# Patient Record
Sex: FEMALE | Race: WHITE | NOT HISPANIC OR LATINO
[De-identification: names, ages, dates, MRNs, and addresses within clinical notes are randomized per-mention and may not be internally consistent; named-entity substitution may affect disease eponyms.]

---

## 2021-05-13 PROBLEM — Z00.00 ENCOUNTER FOR PREVENTIVE HEALTH EXAMINATION: Status: ACTIVE | Noted: 2021-05-13

## 2021-06-14 ENCOUNTER — APPOINTMENT (OUTPATIENT)
Dept: ANTEPARTUM | Facility: CLINIC | Age: 21
End: 2021-06-14
Payer: COMMERCIAL

## 2021-06-14 ENCOUNTER — ASOB RESULT (OUTPATIENT)
Age: 21
End: 2021-06-14

## 2021-06-14 PROCEDURE — 99072 ADDL SUPL MATRL&STAF TM PHE: CPT

## 2021-06-14 PROCEDURE — 76811 OB US DETAILED SNGL FETUS: CPT

## 2021-06-28 ENCOUNTER — TRANSCRIPTION ENCOUNTER (OUTPATIENT)
Age: 21
End: 2021-06-28

## 2021-08-25 ENCOUNTER — NON-APPOINTMENT (OUTPATIENT)
Age: 21
End: 2021-08-25

## 2021-08-26 ENCOUNTER — NON-APPOINTMENT (OUTPATIENT)
Age: 21
End: 2021-08-26

## 2021-08-27 ENCOUNTER — APPOINTMENT (OUTPATIENT)
Dept: CARDIOLOGY | Facility: CLINIC | Age: 21
End: 2021-08-27
Payer: COMMERCIAL

## 2021-08-27 ENCOUNTER — NON-APPOINTMENT (OUTPATIENT)
Age: 21
End: 2021-08-27

## 2021-08-27 VITALS
OXYGEN SATURATION: 98 % | HEART RATE: 109 BPM | WEIGHT: 178 LBS | DIASTOLIC BLOOD PRESSURE: 64 MMHG | SYSTOLIC BLOOD PRESSURE: 108 MMHG | HEIGHT: 69 IN | BODY MASS INDEX: 26.36 KG/M2

## 2021-08-27 DIAGNOSIS — R00.2 PALPITATIONS: ICD-10-CM

## 2021-08-27 DIAGNOSIS — I34.1 NONRHEUMATIC MITRAL (VALVE) PROLAPSE: ICD-10-CM

## 2021-08-27 DIAGNOSIS — Z34.93 ENCOUNTER FOR SUPERVISION OF NORMAL PREGNANCY, UNSPECIFIED, THIRD TRIMESTER: ICD-10-CM

## 2021-08-27 PROCEDURE — 93000 ELECTROCARDIOGRAM COMPLETE: CPT

## 2021-08-27 PROCEDURE — 99204 OFFICE O/P NEW MOD 45 MIN: CPT

## 2021-08-27 NOTE — PHYSICAL EXAM
[Well Developed] : well developed [Well Nourished] : well nourished [No Acute Distress] : no acute distress [Normal Conjunctiva] : normal conjunctiva [Normal S1, S2] : normal S1, S2 [No Murmur] : no murmur [Clear Lung Fields] : clear lung fields [Good Air Entry] : good air entry [Soft] : abdomen soft [Non Tender] : non-tender [No Edema] : no edema [Alert and Oriented] : alert and oriented [de-identified] : Anxious [de-identified] : No murmur lying or in the upright position no mitral click

## 2021-08-27 NOTE — HISTORY OF PRESENT ILLNESS
[FreeTextEntry1] : Cristina Winter 21 years old healthy now 7 months pregnant.  She has no history of diabetes hypertension non-smoker no family history of significant heart disease\par \par She has a history of intermittent palpitations in the past\par \par In 2019 she complained of palpitations which are described to be what sounds like an extra heartbeat not a fast rapid beat with probable ports.  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  She recently has been experiencing an increase in the palpitations again described not as a tachycardia but has a sounds like premature beat followed by a strong heartbeat that makes her uncomfortable and can take her breath away but no dizziness or syncope\par \par She is otherwise healthy.  This is her first pregnancy\par \par EKG normal sinus rhythm and within normal limits

## 2021-08-27 NOTE — DISCUSSION/SUMMARY
[FreeTextEntry1] : 1.  I reassured her that I did not feel there was any significant cardiac issue\par \par 2.  She is concerned about the palpitations and they do occur pretty frequently.  I therefore have placed a Zio patch which will monitor her heart rate for 1 week.\par \par 3.  I will discussed the results of the Zio patch after its returned\par \par 4.  After the pregnancy, she should have a repeat echocardiogram to follow-up on this diagnosis of Caballero syndrome made in [place].  She is aware and will schedule after the pregnancy.

## 2021-08-27 NOTE — ASSESSMENT
[FreeTextEntry1] : Cristina Wong is 7 months pregnant with a history of palpitations that sound like premature beats followed by a pause and then a strong beat.  These are either APCs or PVCs.  Her physical exam is unremarkable, blood pressure is normal and physical exam is normal including no murmur even with provocative maneuvers\par \par She carries a diagnosis of Caballero syndrome but I do not appreciate any significant mitral regurgitation\par \par I believe these palpitations are benign either APCs or PVCs.

## 2021-08-27 NOTE — REASON FOR VISIT
[FreeTextEntry1] : Antoinette Winter 21 years old 7 months pregnant here for evaluation of palpitations\par \par Obstetrician: Dr. Portia Johnson

## 2021-10-05 ENCOUNTER — OUTPATIENT (OUTPATIENT)
Dept: OUTPATIENT SERVICES | Facility: HOSPITAL | Age: 21
LOS: 1 days | End: 2021-10-05
Payer: COMMERCIAL

## 2021-10-05 ENCOUNTER — APPOINTMENT (OUTPATIENT)
Dept: MRI IMAGING | Facility: IMAGING CENTER | Age: 21
End: 2021-10-05

## 2021-10-05 DIAGNOSIS — Z00.8 ENCOUNTER FOR OTHER GENERAL EXAMINATION: ICD-10-CM

## 2021-10-05 PROCEDURE — 70551 MRI BRAIN STEM W/O DYE: CPT | Mod: 26

## 2021-10-05 PROCEDURE — 70551 MRI BRAIN STEM W/O DYE: CPT

## 2021-10-28 ENCOUNTER — APPOINTMENT (OUTPATIENT)
Dept: ANTEPARTUM | Facility: CLINIC | Age: 21
End: 2021-10-28
Payer: COMMERCIAL

## 2021-10-28 ENCOUNTER — ASOB RESULT (OUTPATIENT)
Age: 21
End: 2021-10-28

## 2021-10-28 PROCEDURE — 76816 OB US FOLLOW-UP PER FETUS: CPT

## 2021-10-28 PROCEDURE — 76818 FETAL BIOPHYS PROFILE W/NST: CPT

## 2021-11-02 ENCOUNTER — APPOINTMENT (OUTPATIENT)
Dept: ANTEPARTUM | Facility: CLINIC | Age: 21
End: 2021-11-02
Payer: COMMERCIAL

## 2021-11-02 ENCOUNTER — ASOB RESULT (OUTPATIENT)
Age: 21
End: 2021-11-02

## 2021-11-02 ENCOUNTER — INPATIENT (INPATIENT)
Facility: HOSPITAL | Age: 21
LOS: 2 days | Discharge: HOME | End: 2021-11-05
Attending: OBSTETRICS & GYNECOLOGY | Admitting: OBSTETRICS & GYNECOLOGY
Payer: COMMERCIAL

## 2021-11-02 VITALS — OXYGEN SATURATION: 98 % | HEART RATE: 69 BPM

## 2021-11-02 DIAGNOSIS — Z34.80 ENCOUNTER FOR SUPERVISION OF OTHER NORMAL PREGNANCY, UNSPECIFIED TRIMESTER: ICD-10-CM

## 2021-11-02 LAB
BASOPHILS # BLD AUTO: 0.06 K/UL — SIGNIFICANT CHANGE UP (ref 0–0.2)
BASOPHILS NFR BLD AUTO: 0.5 % — SIGNIFICANT CHANGE UP (ref 0–2)
EOSINOPHIL # BLD AUTO: 0.05 K/UL — SIGNIFICANT CHANGE UP (ref 0–0.5)
EOSINOPHIL NFR BLD AUTO: 0.4 % — SIGNIFICANT CHANGE UP (ref 0–6)
HCT VFR BLD CALC: 34.7 % — SIGNIFICANT CHANGE UP (ref 34.5–45)
HGB BLD-MCNC: 11 G/DL — LOW (ref 11.5–15.5)
IMM GRANULOCYTES NFR BLD AUTO: 2 % — HIGH (ref 0–1.5)
LYMPHOCYTES # BLD AUTO: 18.4 % — SIGNIFICANT CHANGE UP (ref 13–44)
LYMPHOCYTES # BLD AUTO: 2.21 K/UL — SIGNIFICANT CHANGE UP (ref 1–3.3)
MCHC RBC-ENTMCNC: 25.9 PG — LOW (ref 27–34)
MCHC RBC-ENTMCNC: 31.7 GM/DL — LOW (ref 32–36)
MCV RBC AUTO: 81.8 FL — SIGNIFICANT CHANGE UP (ref 80–100)
MONOCYTES # BLD AUTO: 0.98 K/UL — HIGH (ref 0–0.9)
MONOCYTES NFR BLD AUTO: 8.1 % — SIGNIFICANT CHANGE UP (ref 2–14)
NEUTROPHILS # BLD AUTO: 8.5 K/UL — HIGH (ref 1.8–7.4)
NEUTROPHILS NFR BLD AUTO: 70.6 % — SIGNIFICANT CHANGE UP (ref 43–77)
NRBC # BLD: 0 /100 WBCS — SIGNIFICANT CHANGE UP (ref 0–0)
PLATELET # BLD AUTO: 227 K/UL — SIGNIFICANT CHANGE UP (ref 150–400)
RBC # BLD: 4.24 M/UL — SIGNIFICANT CHANGE UP (ref 3.8–5.2)
RBC # FLD: 13.7 % — SIGNIFICANT CHANGE UP (ref 10.3–14.5)
SARS-COV-2 RNA SPEC QL NAA+PROBE: SIGNIFICANT CHANGE UP
WBC # BLD: 12.04 K/UL — HIGH (ref 3.8–10.5)
WBC # FLD AUTO: 12.04 K/UL — HIGH (ref 3.8–10.5)

## 2021-11-02 PROCEDURE — 76818 FETAL BIOPHYS PROFILE W/NST: CPT

## 2021-11-02 RX ORDER — SODIUM CHLORIDE 9 MG/ML
1000 INJECTION, SOLUTION INTRAVENOUS
Refills: 0 | Status: DISCONTINUED | OUTPATIENT
Start: 2021-11-02 | End: 2021-11-03

## 2021-11-02 RX ORDER — CITRIC ACID/SODIUM CITRATE 300-500 MG
15 SOLUTION, ORAL ORAL EVERY 6 HOURS
Refills: 0 | Status: DISCONTINUED | OUTPATIENT
Start: 2021-11-02 | End: 2021-11-03

## 2021-11-02 RX ORDER — OXYTOCIN 10 UNIT/ML
333.33 VIAL (ML) INJECTION
Qty: 20 | Refills: 0 | Status: DISCONTINUED | OUTPATIENT
Start: 2021-11-02 | End: 2021-11-05

## 2021-11-02 RX ORDER — SENNA PLUS 8.6 MG/1
2 TABLET ORAL AT BEDTIME
Refills: 0 | Status: DISCONTINUED | OUTPATIENT
Start: 2021-11-02 | End: 2021-11-05

## 2021-11-02 RX ORDER — INFLUENZA VIRUS VACCINE 15; 15; 15; 15 UG/.5ML; UG/.5ML; UG/.5ML; UG/.5ML
0.5 SUSPENSION INTRAMUSCULAR ONCE
Refills: 0 | Status: DISCONTINUED | OUTPATIENT
Start: 2021-11-02 | End: 2021-11-05

## 2021-11-02 RX ADMIN — SODIUM CHLORIDE 125 MILLILITER(S): 9 INJECTION, SOLUTION INTRAVENOUS at 17:05

## 2021-11-02 RX ADMIN — SODIUM CHLORIDE 125 MILLILITER(S): 9 INJECTION, SOLUTION INTRAVENOUS at 16:45

## 2021-11-02 NOTE — OB PROVIDER H&P - NSHOSPITALIZATION_OBGYN_ALL_OB
I have reviewed discharge instructions with the patient. The patient verbalized understanding. Patient NAD, VSS. Patient armband removed and shredded.
Patient refused all medications. Patient only given ice chips for PO Challenge.
No

## 2021-11-02 NOTE — OB PROVIDER H&P - NSHPPHYSICALEXAM_GEN_ALL_CORE
Gen NAD  Vital Signs Last 24 Hrs  T(C): 36.7 (02 Nov 2021 15:30), Max: 36.7 (02 Nov 2021 15:30)  T(F): 98 (02 Nov 2021 15:30), Max: 98 (02 Nov 2021 15:30)  HR: 76 (02 Nov 2021 16:43) (66 - 105)  BP: 124/71 (02 Nov 2021 15:42) (121/79 - 124/71)  BP(mean): --  RR: 16 (02 Nov 2021 15:30) (16 - 16)  SpO2: 99% (02 Nov 2021 16:43) (98% - 100%)  CV RRR  Lungs CTA B/L  Abd soft/NT, gravid  Ext soft, NT b/l  / mod jessika/ (-) accel/ (+) occ variable decel  [place] irreg ctx  VE L/C/P examined

## 2021-11-02 NOTE — OB PROVIDER H&P - ASSESSMENT
20yo  @ 40 wks IOL for Cat2 FHT  Admit to L&D  EFM/[place]  Routine labs  IVFluids  NPO/Bicitra  PO cyto for IOL  Anesthesia c/s  Anticipated   D/W Dr. Sanchez

## 2021-11-02 NOTE — OB PROVIDER H&P - HISTORY OF PRESENT ILLNESS
22yo  @ 40wks presents via ambulette from [facility] where she had variable decels down to 60bpm on NST. Pt for IOL. (+) fetal movement (+) occ tightening. Denies leakage of fluid or vaginal bleeding. PNC uncomplicated.   EFW 3700  GBS (-)   Ax: NKDA  MedHx: denies  Meds: PNV   OBHx: mild poly, NICOLA 26.6  GynHx: Denies abnl paps/cysts/fibroids/endometriosis/HPV  SurgHx: L ACL repair  FamilyHx: denies  Pt accepts blood products

## 2021-11-03 LAB
ALBUMIN SERPL ELPH-MCNC: 3.5 G/DL — SIGNIFICANT CHANGE UP (ref 3.3–5)
ALP SERPL-CCNC: 93 U/L — SIGNIFICANT CHANGE UP (ref 40–120)
ALT FLD-CCNC: 11 U/L — SIGNIFICANT CHANGE UP (ref 10–45)
ANION GAP SERPL CALC-SCNC: 13 MMOL/L — SIGNIFICANT CHANGE UP (ref 5–17)
APPEARANCE UR: CLEAR — SIGNIFICANT CHANGE UP
APTT BLD: 23.8 SEC — LOW (ref 27.5–35.5)
AST SERPL-CCNC: 19 U/L — SIGNIFICANT CHANGE UP (ref 10–40)
BACTERIA # UR AUTO: NEGATIVE — SIGNIFICANT CHANGE UP
BASOPHILS # BLD AUTO: 0.06 K/UL — SIGNIFICANT CHANGE UP (ref 0–0.2)
BASOPHILS NFR BLD AUTO: 0.4 % — SIGNIFICANT CHANGE UP (ref 0–2)
BILIRUB SERPL-MCNC: 0.5 MG/DL — SIGNIFICANT CHANGE UP (ref 0.2–1.2)
BILIRUB UR-MCNC: NEGATIVE — SIGNIFICANT CHANGE UP
BUN SERPL-MCNC: 4 MG/DL — LOW (ref 7–23)
CALCIUM SERPL-MCNC: 9 MG/DL — SIGNIFICANT CHANGE UP (ref 8.4–10.5)
CHLORIDE SERPL-SCNC: 106 MMOL/L — SIGNIFICANT CHANGE UP (ref 96–108)
CO2 SERPL-SCNC: 20 MMOL/L — LOW (ref 22–31)
COLOR SPEC: SIGNIFICANT CHANGE UP
COVID-19 SPIKE DOMAIN AB INTERP: POSITIVE
COVID-19 SPIKE DOMAIN ANTIBODY RESULT: >250 U/ML — HIGH
CREAT ?TM UR-MCNC: 42 MG/DL — SIGNIFICANT CHANGE UP
CREAT SERPL-MCNC: 0.7 MG/DL — SIGNIFICANT CHANGE UP (ref 0.5–1.3)
DIFF PNL FLD: ABNORMAL
EOSINOPHIL # BLD AUTO: 0.01 K/UL — SIGNIFICANT CHANGE UP (ref 0–0.5)
EOSINOPHIL NFR BLD AUTO: 0.1 % — SIGNIFICANT CHANGE UP (ref 0–6)
EPI CELLS # UR: 1 /HPF — SIGNIFICANT CHANGE UP
FIBRINOGEN PPP-MCNC: 637 MG/DL — HIGH (ref 290–520)
GLUCOSE SERPL-MCNC: 98 MG/DL — SIGNIFICANT CHANGE UP (ref 70–99)
GLUCOSE UR QL: NEGATIVE — SIGNIFICANT CHANGE UP
HCT VFR BLD CALC: 35.1 % — SIGNIFICANT CHANGE UP (ref 34.5–45)
HGB BLD-MCNC: 11.5 G/DL — SIGNIFICANT CHANGE UP (ref 11.5–15.5)
HYALINE CASTS # UR AUTO: 2 /LPF — SIGNIFICANT CHANGE UP (ref 0–2)
IMM GRANULOCYTES NFR BLD AUTO: 0.8 % — SIGNIFICANT CHANGE UP (ref 0–1.5)
INR BLD: 0.98 RATIO — SIGNIFICANT CHANGE UP (ref 0.88–1.16)
KETONES UR-MCNC: NEGATIVE — SIGNIFICANT CHANGE UP
LDH SERPL L TO P-CCNC: 167 U/L — SIGNIFICANT CHANGE UP (ref 50–242)
LEUKOCYTE ESTERASE UR-ACNC: NEGATIVE — SIGNIFICANT CHANGE UP
LYMPHOCYTES # BLD AUTO: 1.52 K/UL — SIGNIFICANT CHANGE UP (ref 1–3.3)
LYMPHOCYTES # BLD AUTO: 10.8 % — LOW (ref 13–44)
MCHC RBC-ENTMCNC: 26.8 PG — LOW (ref 27–34)
MCHC RBC-ENTMCNC: 32.8 GM/DL — SIGNIFICANT CHANGE UP (ref 32–36)
MCV RBC AUTO: 81.8 FL — SIGNIFICANT CHANGE UP (ref 80–100)
MONOCYTES # BLD AUTO: 1.11 K/UL — HIGH (ref 0–0.9)
MONOCYTES NFR BLD AUTO: 7.9 % — SIGNIFICANT CHANGE UP (ref 2–14)
NEUTROPHILS # BLD AUTO: 11.3 K/UL — HIGH (ref 1.8–7.4)
NEUTROPHILS NFR BLD AUTO: 80 % — HIGH (ref 43–77)
NITRITE UR-MCNC: NEGATIVE — SIGNIFICANT CHANGE UP
NRBC # BLD: 0 /100 WBCS — SIGNIFICANT CHANGE UP (ref 0–0)
PH UR: 8 — SIGNIFICANT CHANGE UP (ref 5–8)
PLATELET # BLD AUTO: 224 K/UL — SIGNIFICANT CHANGE UP (ref 150–400)
POTASSIUM SERPL-MCNC: 4 MMOL/L — SIGNIFICANT CHANGE UP (ref 3.5–5.3)
POTASSIUM SERPL-SCNC: 4 MMOL/L — SIGNIFICANT CHANGE UP (ref 3.5–5.3)
PROT ?TM UR-MCNC: 18 MG/DL — HIGH (ref 0–12)
PROT ?TM UR-MCNC: 22 MG/DL — HIGH (ref 0–12)
PROT SERPL-MCNC: 6.3 G/DL — SIGNIFICANT CHANGE UP (ref 6–8.3)
PROT UR-MCNC: SIGNIFICANT CHANGE UP
PROT/CREAT UR-RTO: 0.4 RATIO — HIGH (ref 0–0.2)
PROTHROM AB SERPL-ACNC: 11.8 SEC — SIGNIFICANT CHANGE UP (ref 10.6–13.6)
RBC # BLD: 4.29 M/UL — SIGNIFICANT CHANGE UP (ref 3.8–5.2)
RBC # FLD: 13.7 % — SIGNIFICANT CHANGE UP (ref 10.3–14.5)
RBC CASTS # UR COMP ASSIST: 26 /HPF — HIGH (ref 0–4)
SARS-COV-2 IGG+IGM SERPL QL IA: >250 U/ML — HIGH
SARS-COV-2 IGG+IGM SERPL QL IA: POSITIVE
SODIUM SERPL-SCNC: 139 MMOL/L — SIGNIFICANT CHANGE UP (ref 135–145)
SP GR SPEC: 1.01 — SIGNIFICANT CHANGE UP (ref 1.01–1.02)
T PALLIDUM AB TITR SER: NEGATIVE — SIGNIFICANT CHANGE UP
URATE SERPL-MCNC: 4.6 MG/DL — SIGNIFICANT CHANGE UP (ref 2.5–7)
UROBILINOGEN FLD QL: NEGATIVE — SIGNIFICANT CHANGE UP
WBC # BLD: 14.12 K/UL — HIGH (ref 3.8–10.5)
WBC # FLD AUTO: 14.12 K/UL — HIGH (ref 3.8–10.5)
WBC UR QL: 4 /HPF — SIGNIFICANT CHANGE UP (ref 0–5)

## 2021-11-03 RX ORDER — LANOLIN
1 OINTMENT (GRAM) TOPICAL EVERY 6 HOURS
Refills: 0 | Status: DISCONTINUED | OUTPATIENT
Start: 2021-11-03 | End: 2021-11-05

## 2021-11-03 RX ORDER — KETOROLAC TROMETHAMINE 30 MG/ML
30 SYRINGE (ML) INJECTION ONCE
Refills: 0 | Status: DISCONTINUED | OUTPATIENT
Start: 2021-11-03 | End: 2021-11-03

## 2021-11-03 RX ORDER — ACETAMINOPHEN 500 MG
975 TABLET ORAL ONCE
Refills: 0 | Status: COMPLETED | OUTPATIENT
Start: 2021-11-03 | End: 2021-11-03

## 2021-11-03 RX ORDER — ACETAMINOPHEN 500 MG
975 TABLET ORAL
Refills: 0 | Status: DISCONTINUED | OUTPATIENT
Start: 2021-11-03 | End: 2021-11-05

## 2021-11-03 RX ORDER — SIMETHICONE 80 MG/1
80 TABLET, CHEWABLE ORAL EVERY 4 HOURS
Refills: 0 | Status: DISCONTINUED | OUTPATIENT
Start: 2021-11-03 | End: 2021-11-05

## 2021-11-03 RX ORDER — CEFAZOLIN SODIUM 1 G
2000 VIAL (EA) INJECTION ONCE
Refills: 0 | Status: COMPLETED | OUTPATIENT
Start: 2021-11-03 | End: 2021-11-03

## 2021-11-03 RX ORDER — OXYCODONE HYDROCHLORIDE 5 MG/1
5 TABLET ORAL
Refills: 0 | Status: DISCONTINUED | OUTPATIENT
Start: 2021-11-03 | End: 2021-11-05

## 2021-11-03 RX ORDER — CEFAZOLIN SODIUM 1 G
2000 VIAL (EA) INJECTION EVERY 8 HOURS
Refills: 0 | Status: DISCONTINUED | OUTPATIENT
Start: 2021-11-03 | End: 2021-11-05

## 2021-11-03 RX ORDER — POLYETHYLENE GLYCOL 3350 17 G/17G
17 POWDER, FOR SOLUTION ORAL DAILY
Refills: 0 | Status: DISCONTINUED | OUTPATIENT
Start: 2021-11-03 | End: 2021-11-05

## 2021-11-03 RX ORDER — TETANUS TOXOID, REDUCED DIPHTHERIA TOXOID AND ACELLULAR PERTUSSIS VACCINE, ADSORBED 5; 2.5; 8; 8; 2.5 [IU]/.5ML; [IU]/.5ML; UG/.5ML; UG/.5ML; UG/.5ML
0.5 SUSPENSION INTRAMUSCULAR ONCE
Refills: 0 | Status: DISCONTINUED | OUTPATIENT
Start: 2021-11-03 | End: 2021-11-05

## 2021-11-03 RX ORDER — IBUPROFEN 200 MG
600 TABLET ORAL EVERY 6 HOURS
Refills: 0 | Status: COMPLETED | OUTPATIENT
Start: 2021-11-03 | End: 2022-10-02

## 2021-11-03 RX ORDER — DIPHENHYDRAMINE HCL 50 MG
25 CAPSULE ORAL EVERY 6 HOURS
Refills: 0 | Status: COMPLETED | OUTPATIENT
Start: 2021-11-03 | End: 2022-10-02

## 2021-11-03 RX ORDER — SODIUM CHLORIDE 9 MG/ML
3 INJECTION INTRAMUSCULAR; INTRAVENOUS; SUBCUTANEOUS EVERY 8 HOURS
Refills: 0 | Status: DISCONTINUED | OUTPATIENT
Start: 2021-11-03 | End: 2021-11-05

## 2021-11-03 RX ORDER — CEFAZOLIN SODIUM 1 G
VIAL (EA) INJECTION
Refills: 0 | Status: DISCONTINUED | OUTPATIENT
Start: 2021-11-03 | End: 2021-11-05

## 2021-11-03 RX ORDER — BENZOCAINE 10 %
1 GEL (GRAM) MUCOUS MEMBRANE EVERY 6 HOURS
Refills: 0 | Status: DISCONTINUED | OUTPATIENT
Start: 2021-11-03 | End: 2021-11-05

## 2021-11-03 RX ORDER — AER TRAVELER 0.5 G/1
1 SOLUTION RECTAL; TOPICAL EVERY 4 HOURS
Refills: 0 | Status: DISCONTINUED | OUTPATIENT
Start: 2021-11-03 | End: 2021-11-05

## 2021-11-03 RX ORDER — METOCLOPRAMIDE HCL 10 MG
10 TABLET ORAL ONCE
Refills: 0 | Status: COMPLETED | OUTPATIENT
Start: 2021-11-03 | End: 2021-11-03

## 2021-11-03 RX ORDER — OXYTOCIN 10 UNIT/ML
333.33 VIAL (ML) INJECTION
Qty: 20 | Refills: 0 | Status: DISCONTINUED | OUTPATIENT
Start: 2021-11-03 | End: 2021-11-05

## 2021-11-03 RX ORDER — MAGNESIUM HYDROXIDE 400 MG/1
30 TABLET, CHEWABLE ORAL
Refills: 0 | Status: DISCONTINUED | OUTPATIENT
Start: 2021-11-03 | End: 2021-11-05

## 2021-11-03 RX ORDER — PRAMOXINE HYDROCHLORIDE 150 MG/15G
1 AEROSOL, FOAM RECTAL EVERY 4 HOURS
Refills: 0 | Status: DISCONTINUED | OUTPATIENT
Start: 2021-11-03 | End: 2021-11-05

## 2021-11-03 RX ORDER — OXYCODONE HYDROCHLORIDE 5 MG/1
5 TABLET ORAL ONCE
Refills: 0 | Status: DISCONTINUED | OUTPATIENT
Start: 2021-11-03 | End: 2021-11-05

## 2021-11-03 RX ORDER — HYDROCORTISONE 1 %
1 OINTMENT (GRAM) TOPICAL EVERY 6 HOURS
Refills: 0 | Status: DISCONTINUED | OUTPATIENT
Start: 2021-11-03 | End: 2021-11-05

## 2021-11-03 RX ORDER — DIPHENHYDRAMINE HCL 50 MG
25 CAPSULE ORAL EVERY 6 HOURS
Refills: 0 | Status: DISCONTINUED | OUTPATIENT
Start: 2021-11-03 | End: 2021-11-05

## 2021-11-03 RX ORDER — DIBUCAINE 1 %
1 OINTMENT (GRAM) RECTAL EVERY 6 HOURS
Refills: 0 | Status: DISCONTINUED | OUTPATIENT
Start: 2021-11-03 | End: 2021-11-05

## 2021-11-03 RX ADMIN — Medication 30 MILLIGRAM(S): at 20:12

## 2021-11-03 RX ADMIN — Medication 1000 MILLIUNIT(S)/MIN: at 15:18

## 2021-11-03 RX ADMIN — Medication 975 MILLIGRAM(S): at 08:51

## 2021-11-03 RX ADMIN — Medication 100 MILLIGRAM(S): at 19:24

## 2021-11-03 RX ADMIN — Medication 10 MILLIGRAM(S): at 23:45

## 2021-11-03 RX ADMIN — Medication 25 MILLIGRAM(S): at 23:45

## 2021-11-03 RX ADMIN — Medication 30 MILLIGRAM(S): at 17:01

## 2021-11-03 RX ADMIN — Medication 100 MILLIGRAM(S): at 10:30

## 2021-11-03 RX ADMIN — Medication 975 MILLIGRAM(S): at 22:21

## 2021-11-03 RX ADMIN — Medication 100 MILLIGRAM(S): at 02:00

## 2021-11-03 RX ADMIN — Medication 975 MILLIGRAM(S): at 21:51

## 2021-11-03 NOTE — OB NEONATOLOGY/PEDIATRICIAN DELIVERY SUMMARY - NSPEDSNEONOTESA_OBGYN_ALL_OB_FT
Requested by OB to attend this vacuum assisted vaginal delivery at 40.1weeks.  Mother is a 21 year old, , Blood type B+.  Prenatal labs as follow: HIV neg, RPR non-reactive, rubella immune, HBsA neg, GBS neg on 10/20/21 . Maternal history significant for left ACL repair in . No significant prenatal history.  This pregnancy was complicated by fetal decels during NST on 21 at which time MOB was transferred by ambulette to [facility] for IOL. AROM 23:42 with clear fluid 16  hours prior to delivery.  Infant emerged vertex, tight CAN X2, vigorous, with good tone. Delayed cord clamping X  20 secs, then brought to warmer. Dried, suctioned and stimulated.  Apgars 9/9.  Mom wishes to breast feed. Consents to Hep B vaccine.  Infant admitted to NBN for routine care. Parents updated.

## 2021-11-03 NOTE — OB PROVIDER DELIVERY SUMMARY - NSPROVIDERDELIVERYNOTE_OBGYN_ALL_OB_FT
VAVD, pt delivered of a viable female infant apgar 9/9, OP. vacuum done from OP+2 over two contractions secondary to recurrent deep variable decelerations . MLE, and fourth degree laceration. repaired in the usuakl fashion. ebl 250 cc. nuchal cord times two, tight

## 2021-11-03 NOTE — OB PROVIDER LABOR PROGRESS NOTE - ASSESSMENT
Patient is a 22yo  @40w1d CATII FHT IOL, now s/p PO cytotec and fully dilated. Continue resuscitative measures for new onset CAT II FHT. Overall reassuring fetal status. Expect .    Gretchen Graham, PGY4  D/W Dr. Sanchez, en route

## 2021-11-03 NOTE — OB PROVIDER LABOR PROGRESS NOTE - NS_SUBJECTIVE/OBJECTIVE_OBGYN_ALL_OB_FT
ADA MULLIGAN note:    Pt seen and examined for vaginal pressure/urge to push. +FM. Continues to leak clear fluid.     O:  ICU Vital Signs Last 24 Hrs  T(C): 37.1 (03 Nov 2021 06:56), Max: 37.1 (02 Nov 2021 20:01)  T(F): 98.8 (03 Nov 2021 04:51), Max: 98.8 (02 Nov 2021 20:04)  HR: 91 (03 Nov 2021 11:33) (51 - 105)  BP: 136/56 (03 Nov 2021 11:31) (87/49 - 138/65)  RR: 18 (03 Nov 2021 01:58) (16 - 18)  SpO2: 100% (03 Nov 2021 11:33) (88% - 100%)  gen: NAD   abd: soft, gravid

## 2021-11-03 NOTE — OB RN DELIVERY SUMMARY - NSSELHIDDEN_OBGYN_ALL_OB_FT
[NS_DeliveryAttending1_OBGYN_ALL_OB_FT:[email or web address]=],[NS_DeliveryRN_OBGYN_ALL_OB_FT:[id number]=]

## 2021-11-03 NOTE — OB RN DELIVERY SUMMARY - NS_SEPSISRSKCALC_OBGYN_ALL_OB_FT
EOS calculated successfully. EOS Risk Factor: 0.5/1000 live births ([facility] national incidence); GA=40w1d; Temp=98.8; ROM=15.533; GBS='Negative'; Antibiotics='Broad spectrum antibiotics > 4 hrs prior to birth'

## 2021-11-03 NOTE — OB RN DELIVERY SUMMARY - NS_LABORCHARACTER_OBGYN_ALL_OB
Induction of labor-Medicinal/Augmentation of labor/Internal electronic FM/External electronic FM/Antibiotics in labor/Fetal intolerance

## 2021-11-04 ENCOUNTER — TRANSCRIPTION ENCOUNTER (OUTPATIENT)
Age: 21
End: 2021-11-04

## 2021-11-04 RX ORDER — IBUPROFEN 200 MG
600 TABLET ORAL EVERY 6 HOURS
Refills: 0 | Status: DISCONTINUED | OUTPATIENT
Start: 2021-11-04 | End: 2021-11-05

## 2021-11-04 RX ORDER — ACETAMINOPHEN 500 MG
3 TABLET ORAL
Qty: 0 | Refills: 0 | DISCHARGE
Start: 2021-11-04

## 2021-11-04 RX ORDER — IBUPROFEN 200 MG
1 TABLET ORAL
Qty: 0 | Refills: 0 | DISCHARGE
Start: 2021-11-04

## 2021-11-04 RX ADMIN — Medication 100 MILLIGRAM(S): at 10:59

## 2021-11-04 RX ADMIN — Medication 100 MILLIGRAM(S): at 18:44

## 2021-11-04 RX ADMIN — Medication 1 TABLET(S): at 12:10

## 2021-11-04 RX ADMIN — Medication 975 MILLIGRAM(S): at 21:50

## 2021-11-04 RX ADMIN — Medication 975 MILLIGRAM(S): at 15:27

## 2021-11-04 RX ADMIN — Medication 975 MILLIGRAM(S): at 21:52

## 2021-11-04 RX ADMIN — Medication 975 MILLIGRAM(S): at 03:21

## 2021-11-04 RX ADMIN — Medication 600 MILLIGRAM(S): at 12:10

## 2021-11-04 RX ADMIN — Medication 975 MILLIGRAM(S): at 09:16

## 2021-11-04 RX ADMIN — Medication 975 MILLIGRAM(S): at 15:00

## 2021-11-04 RX ADMIN — Medication 975 MILLIGRAM(S): at 10:00

## 2021-11-04 RX ADMIN — SODIUM CHLORIDE 3 MILLILITER(S): 9 INJECTION INTRAMUSCULAR; INTRAVENOUS; SUBCUTANEOUS at 22:00

## 2021-11-04 RX ADMIN — Medication 600 MILLIGRAM(S): at 18:00

## 2021-11-04 RX ADMIN — Medication 600 MILLIGRAM(S): at 05:55

## 2021-11-04 RX ADMIN — Medication 600 MILLIGRAM(S): at 18:30

## 2021-11-04 RX ADMIN — Medication 100 MILLIGRAM(S): at 03:21

## 2021-11-04 RX ADMIN — Medication 600 MILLIGRAM(S): at 12:38

## 2021-11-04 RX ADMIN — Medication 975 MILLIGRAM(S): at 03:51

## 2021-11-04 RX ADMIN — Medication 600 MILLIGRAM(S): at 06:25

## 2021-11-04 NOTE — DISCHARGE NOTE OB - MATERIALS PROVIDED
[facility] Screening Program/Breastfeeding Log/Breastfeeding Mother’s Support Group Information/Guide to Postpartum Care/[facility] Hearing Screen Program/Breastfeeding Guide and Packet/Birth Certificate Instructions

## 2021-11-04 NOTE — DISCHARGE NOTE OB - PATIENT PORTAL LINK FT
You can access the FollowMyHealth Patient Portal offered by [facility] by registering at the following website: [email or web address]. By joining [facility]’s FollowMyHealth portal, you will also be able to view your health information using other applications (apps) compatible with our system.

## 2021-11-04 NOTE — DISCHARGE NOTE OB - CARE PLAN
Principal Discharge DX:	 (normal spontaneous vaginal delivery)  Assessment and plan of treatment:	return to baseline health, regular diet, pain control, follow up with Dr Sanchez   1

## 2021-11-04 NOTE — DISCHARGE NOTE OB - PROVIDER RX CONTACT NUMBER
S: Patient discharged to home with mother    B: DKA    A: Blood glucose this am 144. Patient using home insulin pump. Denies pain. Denies nausea. VSS afebrile.     R: Discharge instructions reviewed with patient. Listed belongings gathered and returned to patient.        Discharge Nursing Criteria:     Care Plan and Patient education resolved: Yes    New Medications- pt has been educated about purpose and side effects: Yes    Prescriptions if needed, hard copies sent with patient  NA  Home medications returned to patient: NA  Medication Bin checked and emptied on discharge Yes  Patient reports post-discharge pain management plan is effective: Yes       [phone]

## 2021-11-05 VITALS
OXYGEN SATURATION: 99 % | HEART RATE: 77 BPM | TEMPERATURE: 99 F | SYSTOLIC BLOOD PRESSURE: 122 MMHG | RESPIRATION RATE: 18 BRPM | DIASTOLIC BLOOD PRESSURE: 76 MMHG

## 2021-11-05 PROCEDURE — 84550 ASSAY OF BLOOD/URIC ACID: CPT

## 2021-11-05 PROCEDURE — 36415 COLL VENOUS BLD VENIPUNCTURE: CPT

## 2021-11-05 PROCEDURE — 83615 LACTATE (LD) (LDH) ENZYME: CPT

## 2021-11-05 PROCEDURE — 86901 BLOOD TYPING SEROLOGIC RH(D): CPT

## 2021-11-05 PROCEDURE — 85384 FIBRINOGEN ACTIVITY: CPT

## 2021-11-05 PROCEDURE — 81001 URINALYSIS AUTO W/SCOPE: CPT

## 2021-11-05 PROCEDURE — 85610 PROTHROMBIN TIME: CPT

## 2021-11-05 PROCEDURE — 59025 FETAL NON-STRESS TEST: CPT

## 2021-11-05 PROCEDURE — 85730 THROMBOPLASTIN TIME PARTIAL: CPT

## 2021-11-05 PROCEDURE — 82570 ASSAY OF URINE CREATININE: CPT

## 2021-11-05 PROCEDURE — 86900 BLOOD TYPING SEROLOGIC ABO: CPT

## 2021-11-05 PROCEDURE — 86850 RBC ANTIBODY SCREEN: CPT

## 2021-11-05 PROCEDURE — 86769 SARS-COV-2 COVID-19 ANTIBODY: CPT

## 2021-11-05 PROCEDURE — 87635 SARS-COV-2 COVID-19 AMP PRB: CPT

## 2021-11-05 PROCEDURE — 86780 TREPONEMA PALLIDUM: CPT

## 2021-11-05 PROCEDURE — U0003: CPT

## 2021-11-05 PROCEDURE — 84156 ASSAY OF PROTEIN URINE: CPT

## 2021-11-05 PROCEDURE — 85025 COMPLETE CBC W/AUTO DIFF WBC: CPT

## 2021-11-05 PROCEDURE — 80053 COMPREHEN METABOLIC PANEL: CPT

## 2021-11-05 PROCEDURE — 59050 FETAL MONITOR W/REPORT: CPT

## 2021-11-05 RX ADMIN — Medication 975 MILLIGRAM(S): at 02:33

## 2021-11-05 RX ADMIN — Medication 975 MILLIGRAM(S): at 08:54

## 2021-11-05 RX ADMIN — Medication 600 MILLIGRAM(S): at 07:00

## 2021-11-05 RX ADMIN — Medication 100 MILLIGRAM(S): at 02:34

## 2021-11-05 RX ADMIN — Medication 975 MILLIGRAM(S): at 09:25

## 2021-11-05 RX ADMIN — Medication 975 MILLIGRAM(S): at 03:00

## 2021-11-05 RX ADMIN — SODIUM CHLORIDE 3 MILLILITER(S): 9 INJECTION INTRAMUSCULAR; INTRAVENOUS; SUBCUTANEOUS at 06:28

## 2021-11-05 RX ADMIN — Medication 600 MILLIGRAM(S): at 06:30

## 2021-11-05 NOTE — PROGRESS NOTE ADULT - ASSESSMENT
A/P: 22yo PPD#1 s/p VAVD complicated by a fourth degree laceration.  Patient is stable and doing well post-partum.   - Pt s/p Ancef for 24 hours.   - Pain well controlled, continue current pain regimen  - Increase ambulation, SCDs when not ambulating  - Continue regular diet    Ladan Vigil MD  PGY-1 
A/P: 20yo  PPD#2 s/p VAVD for NRFHT c/b 4th degree lac.  Patient is stable and doing well post-partum.     - Pain well controlled, continue current pain regimen  - Increase ambulation, SCDs when not ambulating  - Continue regular diet  - Discharge planning     TISHA Francis PGY1

## 2021-11-05 NOTE — PROGRESS NOTE ADULT - SUBJECTIVE AND OBJECTIVE BOX
OB Progress Note:  PPD#2    S: 20yo  PPD#2 s/p . Patient feels well. Pain is well controlled. She is tolerating a regular diet and passing flatus. She is voiding spontaneously, and ambulating without difficulty. Denies CP/SOB. Denies HA/lightheadedness/dizziness. Denies N/V. Denies calf pain    O:  Vitals:   Vital Signs Last 24 Hrs  T(C): 36.7 (2021 06:32), Max: 37.2 (2021 13:00)  T(F): 98.1 (2021 06:32), Max: 99 (2021 13:00)  HR: 74 (2021 06:32) (67 - 76)  BP: 133/77 (2021 06:32) (100/63 - 133/77)  BP(mean): --  RR: 17 (2021 06:32) (17 - 18)  SpO2: 99% (2021 06:32) (98% - 99%)    MEDICATIONS  (STANDING):  acetaminophen     Tablet .. 975 milliGRAM(s) Oral <User Schedule>  ceFAZolin   IVPB      ceFAZolin   IVPB 2000 milliGRAM(s) IV Intermittent every 8 hours  diphtheria/tetanus/pertussis (acellular) Vaccine (ADAcel) 0.5 milliLiter(s) IntraMuscular once  ibuprofen  Tablet. 600 milliGRAM(s) Oral every 6 hours  influenza   Vaccine 0.5 milliLiter(s) IntraMuscular once  oxytocin Infusion 333.333 milliUNIT(s)/Min (1000 mL/Hr) IV Continuous <Continuous>  oxytocin Infusion 333.333 milliUNIT(s)/Min (1000 mL/Hr) IV Continuous <Continuous>  polyethylene glycol 3350 17 Gram(s) Oral daily  prenatal multivitamin 1 Tablet(s) Oral daily  senna 2 Tablet(s) Oral at bedtime  sodium chloride 0.9% lock flush 3 milliLiter(s) IV Push every 8 hours    MEDICATIONS  (PRN):  benzocaine 20%/menthol 0.5% Spray 1 Spray(s) Topical every 6 hours PRN for Perineal discomfort  dibucaine 1% Ointment 1 Application(s) Topical every 6 hours PRN Perineal discomfort  diphenhydrAMINE 25 milliGRAM(s) Oral every 6 hours PRN Pruritus  hydrocortisone 1% Cream 1 Application(s) Topical every 6 hours PRN Moderate Pain (4-6)  lanolin Ointment 1 Application(s) Topical every 6 hours PRN nipple soreness  magnesium hydroxide Suspension 30 milliLiter(s) Oral two times a day PRN Constipation  oxyCODONE    IR 5 milliGRAM(s) Oral every 3 hours PRN Moderate to Severe Pain (4-10)  oxyCODONE    IR 5 milliGRAM(s) Oral once PRN Moderate to Severe Pain (4-10)  pramoxine 1%/zinc 5% Cream 1 Application(s) Topical every 4 hours PRN Moderate Pain (4-6)  simethicone 80 milliGRAM(s) Chew every 4 hours PRN Gas  witch hazel Pads 1 Application(s) Topical every 4 hours PRN Perineal discomfort      Labs:  Blood type: B Positive  Rubella IgG: RPR: Negative                          11.5   14.12<H> >-----------< 224    (  @ 12:57 )             35.1                        11.0<L>   12.04<H> >-----------< 227    (  @ 17:39 )             34.7    21 @ 12:57      139  |  106  |  4<L>  ----------------------------<  98  4.0   |  20<L>  |  0.70        Ca    9.0      2021 12:57    TPro  6.3  /  Alb  3.5  /  TBili  0.5  /  DBili  x   /  AST  19  /  ALT  11  /  AlkPhos  93  21 @ 12:57          Physical Exam:  General: NAD  CV: RRR  Pulm: CTAB  Abdomen: soft, non-tender, non-distended, fundus firm below umbilicus  Vaginal: lochia wnl, non-edematous vulva, no vulvar hematoma or erythema  Extremities: No erythema/calf tenderness    
S: 20yo  PPD#1 s/p VAVD. Patient feels well. Pain is well controlled. She is tolerating a regular diet and passing flatus. She is voiding spontaneously, and ambulating without difficulty. Denies CP/SOB. Denies lightheadedness/dizziness. Denies N/V.    O:  Vitals:  Vital Signs Last 24 Hrs  T(C): 36.7 (04 Nov 2021 05:05), Max: 37.3 (04 Nov 2021 01:10)  T(F): 98.1 (04 Nov 2021 05:05), Max: 99.1 (04 Nov 2021 01:10)  HR: 75 (04 Nov 2021 05:05) (51 - 153)  BP: 103/65 (04 Nov 2021 05:05) (87/49 - 164/67)  BP(mean): 84 (03 Nov 2021 22:20) (84 - 87)  RR: 18 (04 Nov 2021 05:05) (14 - 19)  SpO2: 98% (04 Nov 2021 05:05) (70% - 100%)    MEDICATIONS  (STANDING):  acetaminophen     Tablet .. 975 milliGRAM(s) Oral <User Schedule>  ceFAZolin   IVPB      ceFAZolin   IVPB 2000 milliGRAM(s) IV Intermittent every 8 hours  diphtheria/tetanus/pertussis (acellular) Vaccine (ADAcel) 0.5 milliLiter(s) IntraMuscular once  ibuprofen  Tablet. 600 milliGRAM(s) Oral every 6 hours  influenza   Vaccine 0.5 milliLiter(s) IntraMuscular once  oxytocin Infusion 333.333 milliUNIT(s)/Min (1000 mL/Hr) IV Continuous <Continuous>  oxytocin Infusion 333.333 milliUNIT(s)/Min (1000 mL/Hr) IV Continuous <Continuous>  polyethylene glycol 3350 17 Gram(s) Oral daily  prenatal multivitamin 1 Tablet(s) Oral daily  senna 2 Tablet(s) Oral at bedtime  sodium chloride 0.9% lock flush 3 milliLiter(s) IV Push every 8 hours      Labs:  Blood type: B Positive  Rubella IgG: RPR: Negative                          11.5   14.12<H> >-----------< 224    ( 11-03 @ 12:57 )             35.1                        11.0<L>   12.04<H> >-----------< 227    ( 11-02 @ 17:39 )             34.7    11-03-21 @ 12:57      139  |  106  |  4<L>  ----------------------------<  98  4.0   |  20<L>  |  0.70        Ca    9.0      03 Nov 2021 12:57    TPro  6.3  /  Alb  3.5  /  TBili  0.5  /  DBili  x   /  AST  19  /  ALT  11  /  AlkPhos  93  11-03-21 @ 12:57          Physical Exam:  General: NAD  Abdomen: soft, non-tender, non-distended, fundus firm and below the umbillicus   Perineum without significant swelling or irritation.   Vaginal: Lochia wnl  Extremities: No erythema/edema. No calf tenderness

## 2022-08-25 ENCOUNTER — RESULT REVIEW (OUTPATIENT)
Age: 22
End: 2022-08-25

## 2022-11-02 NOTE — OB RN DELIVERY SUMMARY - NS_INTRAPARTUMABXTYPE_OBGYN_ALL_OB
Broad spectrum antibiotics > 4 hrs prior to birth Bilobed Flap Text: The defect edges were debeveled with a #15 scalpel blade.  Given the location of the defect and the proximity to free margins a bilobe flap was deemed most appropriate.  Using a sterile surgical marker, an appropriate bilobe flap drawn around the defect.    The area thus outlined was incised deep to adipose tissue with a #15 scalpel blade.  The skin margins were undermined to an appropriate distance in all directions utilizing iris scissors.

## 2022-12-13 ENCOUNTER — ASOB RESULT (OUTPATIENT)
Age: 22
End: 2022-12-13

## 2022-12-13 ENCOUNTER — APPOINTMENT (OUTPATIENT)
Dept: ANTEPARTUM | Facility: CLINIC | Age: 22
End: 2022-12-13
Payer: COMMERCIAL

## 2022-12-13 PROCEDURE — 76811 OB US DETAILED SNGL FETUS: CPT

## 2023-02-28 ENCOUNTER — OUTPATIENT (OUTPATIENT)
Dept: OUTPATIENT SERVICES | Facility: HOSPITAL | Age: 23
LOS: 1 days | End: 2023-02-28
Payer: COMMERCIAL

## 2023-02-28 VITALS
RESPIRATION RATE: 18 BRPM | OXYGEN SATURATION: 97 % | TEMPERATURE: 99 F | HEART RATE: 81 BPM | DIASTOLIC BLOOD PRESSURE: 71 MMHG | SYSTOLIC BLOOD PRESSURE: 121 MMHG

## 2023-02-28 VITALS — TEMPERATURE: 99 F

## 2023-02-28 DIAGNOSIS — O26.899 OTHER SPECIFIED PREGNANCY RELATED CONDITIONS, UNSPECIFIED TRIMESTER: ICD-10-CM

## 2023-02-28 PROCEDURE — 59025 FETAL NON-STRESS TEST: CPT

## 2023-02-28 PROCEDURE — G0463: CPT

## 2023-02-28 NOTE — OB PROVIDER TRIAGE NOTE - PATIENT'S PREFERRED PRONOUN
Her/She
70 y/o male reports a history of frequent urination and chronic urinary tract infections due to enlarge prostate.  He was evaluated by Dr Brooke early this month who recommended above procedure.

## 2023-02-28 NOTE — OB PROVIDER TRIAGE NOTE - HISTORY OF PRESENT ILLNESS
OB Provider triage note    Subjective  HPI: yoF GP gestational age presents for _. +FM. -LOF. -CTXs. -VB. Pt denies any other concerns.    – PNC: denies prenatal issues.     – OB Hx:  – GYN Hx:  denies abnormal pap smears, fibroids, polyps, ovarian cysts, PCOS, Endometriosis, STIs    – PMH: denies  – Meds: PNV   – Allergies: NKDA  – PSHx: denies  – Social: denies    Subjective  HPI: 22yoF  gestational age 31w5d presents for upper abdominal pain since 11am today. Pt. describes abdominal pain as dull most of the time, however the pain becomes sharp during fetal movements. Patient experienced similar pain one week ago, however did not seek care at this time. Pain does not radiate to back, denies nausea/vomiting, dysuria and fever.  +FM. -LOF. -CTXs. -VB. Pt denies any other concerns.    – PNC: denies prenatal issues.   – OB Hx: previous pregnancy was delivered via uncomplicated vaginal delivery, current pregnancy uncomplicated    – GYN Hx:  denies abnormal pap smears, fibroids, polyps, ovarian cysts, PCOS, Endometriosis, STIs  – PMH: denies  – Meds: PNV   – Allergies: NKDA  – PSHx: ACL repair in   – Social: denies     Assessment  22yoF  gestational age 31w5d presents for upper abdominal pain since 11am today, pain is worst with fetal movement and not reproducible with palpation. Presentation is most consistent with musculoskeletal pain secondary to fetal movement.    Plan:   Monitor FHR   Follow up with Dr. Sanchez outpatient after discharge         Subjective  HPI: 22yoF  gestational age 31w5d presents for upper abdominal pain since 11am today. Pt. describes abdominal pain as dull most of the time, however the pain becomes sharp during fetal movements. Patient experienced similar pain one week ago, however did not seek care at this time. Pain does not radiate to back, denies nausea/vomiting, dysuria and fever.  +FM. -LOF. -CTXs. -VB. Pt denies any other concerns.    – PNC: denies prenatal issues.   – OB Hx: previous pregnancy was delivered via uncomplicated vaginal delivery, current pregnancy uncomplicated    – GYN Hx:  denies abnormal pap smears, fibroids, polyps, ovarian cysts, PCOS, Endometriosis, STIs  – PMH: denies  – Meds: PNV   – Allergies: NKDA  – PSHx: ACL repair in   – Social: denies     CV: normal rate and rhythm, no rubs, gallops murmurs  Resp: CTAB  Abdominal Exam: nontender to palpation, no rebound tenderness, no guarding   Ext: no LE edema  FHT: Baseline 135, +accels, -decels, moderate variability    Assessment  22yoF  gestational age 31w5d presents for upper abdominal pain since 11am today, pain is worst with fetal movement and not reproducible with palpation. Presentation is most consistent with musculoskeletal pain secondary to fetal movement.    Plan:   Monitor FHR   Follow up with Dr. Sanchez outpatient after discharge         Subjective  HPI: 22yoF  gestational age 31w5d presents for upper abdominal pain since 11am today. Pt. describes abdominal pain as dull most of the time, however the pain becomes sharp during fetal movements. Patient experienced similar pain one week ago, however did not seek care at this time. Pain does not radiate to back, denies nausea/vomiting, dysuria and fever.  +FM. -LOF. -CTXs. -VB. Pt denies any other concerns.    – PNC: denies prenatal issues.   – OB Hx: previous pregnancy was delivered via uncomplicated vaginal delivery, current pregnancy uncomplicated    – GYN Hx:  denies abnormal pap smears, fibroids, polyps, ovarian cysts, PCOS, Endometriosis, STIs  – PMH: denies  – Meds: PNV   – Allergies: NKDA  – PSHx: ACL repair in   – Social: denies     ICU Vital Signs Last 24 Hrs  T(C): 37.0 (2023 00:40), Max: 37 (2023 00:35)  T(F): 98.6 (2023 00:40), Max: 98.6 (2023 00:35)  HR: 88 (2023 01:13) (81 - 97)  BP: 121/71 (2023 00:38) (121/71 - 121/71)  BP(mean): --  ABP: --  ABP(mean): --  RR: 18 (2023 00:35) (18 - 18)  SpO2: 95% (2023 01:13) (95% - 97%)    O2 Parameters below as of 2023 00:35  Patient On (Oxygen Delivery Method): room air    Physical Exam  CV: normal rate and rhythm, no rubs, gallops murmurs  Resp: CTAB  Abdominal Exam: nontender to palpation, no rebound tenderness, no guarding   Ext: no LE edema  FHT: Baseline 135, +accels, -decels, moderate variability    Assessment  22yoF  gestational age 31w5d presents for upper abdominal pain since 11am today, pain is worst with fetal movement and not reproducible with palpation. Presentation is most consistent with musculoskeletal pain secondary to fetal movement.    Plan:   Follow up with Dr. Sanchez outpatient after discharge         Subjective  HPI: 22yoF  gestational age 31w5d presents for right upper abdominal pain since 11am today. Pt. describes abdominal pain as dull most of the time with intermittent, sporadic sharp pain. Pain is exacerbated by fetal movements. Pain does not radiate to back, denies nausea/vomiting, diarrhea, flank pain, dysuria and fever. Pt had a normal bowel movement this AM. +FM. -LOF. -CTXs. -VB. Pt denies any other concerns.    – PNC: denies prenatal issues.   – OB Hx:   G1 (): FT  7#11, IOL for NRNST, uncomplicated  – GYN Hx: denies abnormal pap smears, fibroids, polyps, ovarian cysts, PCOS, Endometriosis, STIs  – PMH: denies  – Meds: PNV   – Allergies: NKDA  – PSHx: ACL repair in   – Social: denies

## 2023-02-28 NOTE — OB PROVIDER TRIAGE NOTE - NSHPPHYSICALEXAM_GEN_ALL_CORE
CV: normal rate and rhythm, no rubs, gallops murmurs  Resp: CTAB  Abdominal Exam: nontender to palpation, no rebound tenderness, no guarding   Ext: no LE edema CV: normal rate and rhythm, no rubs, gallops murmurs  Resp: CTAB  Abdominal Exam: nontender to palpation, no rebound tenderness, no guarding   Ext: no LE edema  FHT: Baseline 135, +accels, -decels, moderate variability Vital Signs Last 24 Hrs  T(C): 37.0 (28 Feb 2023 00:40), Max: 37 (28 Feb 2023 00:35)  T(F): 98.6 (28 Feb 2023 00:40), Max: 98.6 (28 Feb 2023 00:35)  HR: 85 (28 Feb 2023 01:28) (80 - 97)  BP: 121/71 (28 Feb 2023 00:38) (121/71 - 121/71)  RR: 18 (28 Feb 2023 00:35) (18 - 18)  SpO2: 96% (28 Feb 2023 01:28) (95% - 99%)    Parameters below as of 28 Feb 2023 00:35  Patient On (Oxygen Delivery Method): room air    Physical Exam  General: sitting up in bed, no acute distress, well appearing  CV: regular rate, extremities well perfused  Resp: normal respiratory effort on room air  Abdominal Exam: gravid, soft, nontender to palpation, no rebound tenderness, no guarding   Ext: no LE edema    FHT: Baseline 135, +accels, -decels, moderate variability  [place]: acontractile

## 2023-02-28 NOTE — OB PROVIDER TRIAGE NOTE - NSOBPROVIDERNOTE_OBGYN_ALL_OB_FT
Assessment  22yoF  @31w5d presents for intermittent right upper abdominal pain since 11am today, pain exacerbated by fetal movement and not reproducible with palpation. Blood pressures wnl, no preeclamptic symptoms. No flank pain/dysuria, low suspicion for UTI or pyelonephritis. No nausea/vomiting or signs/symptoms concerning for gallbladder and/or pancreatic etiology. Fetal status reassuring.    - No additional workup indicated at this time  - Encouraged increased PO hydration  - Cleared for discharge to home with outpatient follow up with Dr. Sanchez as scheduled  - Return precautions reviewed    Plan per attending, Dr. Daniel Spears PGY1 Assessment  22yoF  @31w5d presents for intermittent right upper abdominal pain since 11am today, pain exacerbated by fetal movement and not reproducible with palpation. Blood pressures wnl, no preeclamptic symptoms. No flank pain/dysuria, low suspicion for UTI or pyelonephritis. No nausea/vomiting or signs/symptoms concerning for gallbladder and/or pancreatic etiology. Fetal status reassuring.    - NST reactive  - No additional workup indicated at this time  - Encouraged increased PO hydration  - Cleared for discharge to home with outpatient follow up with Dr. Sanchez as scheduled  - Return precautions reviewed    Plan per attending, Dr. Daniel Spears PGY1

## 2023-03-02 DIAGNOSIS — R10.11 RIGHT UPPER QUADRANT PAIN: ICD-10-CM

## 2023-03-02 DIAGNOSIS — Z3A.31 31 WEEKS GESTATION OF PREGNANCY: ICD-10-CM

## 2023-03-02 DIAGNOSIS — O26.893 OTHER SPECIFIED PREGNANCY RELATED CONDITIONS, THIRD TRIMESTER: ICD-10-CM

## 2023-04-11 ENCOUNTER — APPOINTMENT (OUTPATIENT)
Dept: ANTEPARTUM | Facility: CLINIC | Age: 23
End: 2023-04-11
Payer: COMMERCIAL

## 2023-04-11 ENCOUNTER — ASOB RESULT (OUTPATIENT)
Age: 23
End: 2023-04-11

## 2023-04-11 PROCEDURE — 76818 FETAL BIOPHYS PROFILE W/NST: CPT

## 2023-04-11 PROCEDURE — 76816 OB US FOLLOW-UP PER FETUS: CPT

## 2023-04-26 ENCOUNTER — NON-APPOINTMENT (OUTPATIENT)
Age: 23
End: 2023-04-26

## 2023-04-30 ENCOUNTER — OUTPATIENT (OUTPATIENT)
Dept: OUTPATIENT SERVICES | Facility: HOSPITAL | Age: 23
LOS: 1 days | End: 2023-04-30
Payer: COMMERCIAL

## 2023-04-30 VITALS
SYSTOLIC BLOOD PRESSURE: 127 MMHG | RESPIRATION RATE: 16 BRPM | OXYGEN SATURATION: 100 % | TEMPERATURE: 98 F | DIASTOLIC BLOOD PRESSURE: 76 MMHG | HEART RATE: 87 BPM

## 2023-04-30 VITALS — DIASTOLIC BLOOD PRESSURE: 66 MMHG | SYSTOLIC BLOOD PRESSURE: 123 MMHG | TEMPERATURE: 98 F | HEART RATE: 79 BPM

## 2023-04-30 DIAGNOSIS — O26.899 OTHER SPECIFIED PREGNANCY RELATED CONDITIONS, UNSPECIFIED TRIMESTER: ICD-10-CM

## 2023-04-30 PROCEDURE — G0463: CPT

## 2023-04-30 PROCEDURE — 59025 FETAL NON-STRESS TEST: CPT

## 2023-04-30 PROCEDURE — 99212 OFFICE O/P EST SF 10 MIN: CPT

## 2023-04-30 NOTE — OB PROVIDER TRIAGE NOTE - NSOBPROVIDERNOTE_OBGYN_ALL_OB_FT
A/P: 22yo  @ 40.3 weeks in early labor  - NST  - BPP  - likely discharge home    dw Dr Daniel Betancourt PAC A/P: 24yo  @ 40.3 weeks in early labor  - NST  - BPP  - likely discharge home    dw Dr Daniel Betancourt PAC    PA Addendum  BPP8/8, NICOLA 15, vertex, NST reactive  Discharge home with labor precautions     LCavalieri PAC

## 2023-04-30 NOTE — OB PROVIDER TRIAGE NOTE - HISTORY OF PRESENT ILLNESS
24yo  @ 40.3 weeks (CORNELIUS ) presents with contractions q3-5mins, 3/10 in intensity. Pregnancy course uncomplicated. +FM, -LOF, -VB    GBS negative  EFW 3500    OBHx:  FT  7#11  GYNHx: No ovarian cysts, fibroids, hx of abnormal pap or STDs  PMHx: No hx of DM, HTN, asthma, bleeding or clotting disorders or blood transfusions.  PSurgHx: ACL repair   Allergies: NKDA  Meds: PNV  Social: No smoking, alcohol, or illicit drug use during pregnancy   Psych: No hx of anxiety or depression

## 2023-04-30 NOTE — OB PROVIDER TRIAGE NOTE - NSHPPHYSICALEXAM_GEN_ALL_CORE
PE:  T(C): 36.8 (04-30-23 @ 21:58), Max: 36.8 (04-30-23 @ 21:58)  HR: 86 (04-30-23 @ 22:25) (84 - 87)  BP: 127/76 (04-30-23 @ 22:10) (127/76 - 127/76)  RR: 16 (04-30-23 @ 21:58) (16 - 16)  SpO2: 98% (04-30-23 @ 22:25) (97% - 100%)  General: NAD, A&Ox3  CV: RRR  Lungs: Clear bilat   Abd: soft, nontender, gravid  VE: 2/60/-3  EFM: 125/moderate variablity/+accels/-decels  TOCO: irregular

## 2023-05-01 ENCOUNTER — APPOINTMENT (OUTPATIENT)
Dept: ANTEPARTUM | Facility: CLINIC | Age: 23
End: 2023-05-01

## 2023-05-01 ENCOUNTER — INPATIENT (INPATIENT)
Facility: HOSPITAL | Age: 23
LOS: 1 days | Discharge: HOME | End: 2023-05-03
Attending: OBSTETRICS & GYNECOLOGY | Admitting: OBSTETRICS & GYNECOLOGY
Payer: COMMERCIAL

## 2023-05-01 VITALS
SYSTOLIC BLOOD PRESSURE: 120 MMHG | TEMPERATURE: 99 F | HEART RATE: 82 BPM | RESPIRATION RATE: 18 BRPM | DIASTOLIC BLOOD PRESSURE: 74 MMHG

## 2023-05-01 DIAGNOSIS — Z34.80 ENCOUNTER FOR SUPERVISION OF OTHER NORMAL PREGNANCY, UNSPECIFIED TRIMESTER: ICD-10-CM

## 2023-05-01 DIAGNOSIS — O26.899 OTHER SPECIFIED PREGNANCY RELATED CONDITIONS, UNSPECIFIED TRIMESTER: ICD-10-CM

## 2023-05-01 LAB
BASOPHILS # BLD AUTO: 0 K/UL — SIGNIFICANT CHANGE UP (ref 0–0.2)
BASOPHILS NFR BLD AUTO: 0 % — SIGNIFICANT CHANGE UP (ref 0–2)
BLD GP AB SCN SERPL QL: NEGATIVE — SIGNIFICANT CHANGE UP
COVID-19 SPIKE DOMAIN AB INTERP: POSITIVE
COVID-19 SPIKE DOMAIN ANTIBODY RESULT: >250 U/ML — HIGH
EOSINOPHIL # BLD AUTO: 0.31 K/UL — SIGNIFICANT CHANGE UP (ref 0–0.5)
EOSINOPHIL NFR BLD AUTO: 1.7 % — SIGNIFICANT CHANGE UP (ref 0–6)
HCT VFR BLD CALC: 38.1 % — SIGNIFICANT CHANGE UP (ref 34.5–45)
HGB BLD-MCNC: 12 G/DL — SIGNIFICANT CHANGE UP (ref 11.5–15.5)
LYMPHOCYTES # BLD AUTO: 15.6 % — SIGNIFICANT CHANGE UP (ref 13–44)
LYMPHOCYTES # BLD AUTO: 2.83 K/UL — SIGNIFICANT CHANGE UP (ref 1–3.3)
MANUAL SMEAR VERIFICATION: SIGNIFICANT CHANGE UP
MCHC RBC-ENTMCNC: 24.8 PG — LOW (ref 27–34)
MCHC RBC-ENTMCNC: 31.5 GM/DL — LOW (ref 32–36)
MCV RBC AUTO: 78.7 FL — LOW (ref 80–100)
METAMYELOCYTES # FLD: 0.9 % — HIGH (ref 0–0)
MONOCYTES # BLD AUTO: 1.74 K/UL — HIGH (ref 0–0.9)
MONOCYTES NFR BLD AUTO: 9.6 % — SIGNIFICANT CHANGE UP (ref 2–14)
NEUTROPHILS # BLD AUTO: 13.12 K/UL — HIGH (ref 1.8–7.4)
NEUTROPHILS NFR BLD AUTO: 72.2 % — SIGNIFICANT CHANGE UP (ref 43–77)
PLAT MORPH BLD: NORMAL — SIGNIFICANT CHANGE UP
PLATELET # BLD AUTO: 305 K/UL — SIGNIFICANT CHANGE UP (ref 150–400)
RBC # BLD: 4.84 M/UL — SIGNIFICANT CHANGE UP (ref 3.8–5.2)
RBC # FLD: 16.1 % — HIGH (ref 10.3–14.5)
RBC BLD AUTO: SIGNIFICANT CHANGE UP
RH IG SCN BLD-IMP: POSITIVE — SIGNIFICANT CHANGE UP
SARS-COV-2 IGG+IGM SERPL QL IA: >250 U/ML — HIGH
SARS-COV-2 IGG+IGM SERPL QL IA: POSITIVE
WBC # BLD: 18.17 K/UL — HIGH (ref 3.8–10.5)
WBC # FLD AUTO: 18.17 K/UL — HIGH (ref 3.8–10.5)

## 2023-05-01 RX ORDER — LANOLIN
1 OINTMENT (GRAM) TOPICAL EVERY 6 HOURS
Refills: 0 | Status: DISCONTINUED | OUTPATIENT
Start: 2023-05-01 | End: 2023-05-03

## 2023-05-01 RX ORDER — DIBUCAINE 1 %
1 OINTMENT (GRAM) RECTAL EVERY 6 HOURS
Refills: 0 | Status: DISCONTINUED | OUTPATIENT
Start: 2023-05-01 | End: 2023-05-03

## 2023-05-01 RX ORDER — SIMETHICONE 80 MG/1
80 TABLET, CHEWABLE ORAL EVERY 4 HOURS
Refills: 0 | Status: DISCONTINUED | OUTPATIENT
Start: 2023-05-01 | End: 2023-05-03

## 2023-05-01 RX ORDER — OXYCODONE HYDROCHLORIDE 5 MG/1
5 TABLET ORAL ONCE
Refills: 0 | Status: DISCONTINUED | OUTPATIENT
Start: 2023-05-01 | End: 2023-05-03

## 2023-05-01 RX ORDER — TETANUS TOXOID, REDUCED DIPHTHERIA TOXOID AND ACELLULAR PERTUSSIS VACCINE, ADSORBED 5; 2.5; 8; 8; 2.5 [IU]/.5ML; [IU]/.5ML; UG/.5ML; UG/.5ML; UG/.5ML
0.5 SUSPENSION INTRAMUSCULAR ONCE
Refills: 0 | Status: DISCONTINUED | OUTPATIENT
Start: 2023-05-01 | End: 2023-05-03

## 2023-05-01 RX ORDER — SODIUM CHLORIDE 9 MG/ML
1000 INJECTION, SOLUTION INTRAVENOUS ONCE
Refills: 0 | Status: COMPLETED | OUTPATIENT
Start: 2023-05-01 | End: 2023-05-01

## 2023-05-01 RX ORDER — OXYCODONE HYDROCHLORIDE 5 MG/1
5 TABLET ORAL
Refills: 0 | Status: DISCONTINUED | OUTPATIENT
Start: 2023-05-01 | End: 2023-05-03

## 2023-05-01 RX ORDER — OXYTOCIN 10 UNIT/ML
10 VIAL (ML) INJECTION ONCE
Refills: 0 | Status: COMPLETED | OUTPATIENT
Start: 2023-05-01 | End: 2023-05-01

## 2023-05-01 RX ORDER — IBUPROFEN 200 MG
600 TABLET ORAL EVERY 6 HOURS
Refills: 0 | Status: COMPLETED | OUTPATIENT
Start: 2023-05-01 | End: 2024-03-29

## 2023-05-01 RX ORDER — SODIUM CHLORIDE 9 MG/ML
1000 INJECTION, SOLUTION INTRAVENOUS
Refills: 0 | Status: DISCONTINUED | OUTPATIENT
Start: 2023-05-01 | End: 2023-05-01

## 2023-05-01 RX ORDER — SODIUM CHLORIDE 9 MG/ML
3 INJECTION INTRAMUSCULAR; INTRAVENOUS; SUBCUTANEOUS EVERY 8 HOURS
Refills: 0 | Status: DISCONTINUED | OUTPATIENT
Start: 2023-05-01 | End: 2023-05-03

## 2023-05-01 RX ORDER — AER TRAVELER 0.5 G/1
1 SOLUTION RECTAL; TOPICAL EVERY 4 HOURS
Refills: 0 | Status: DISCONTINUED | OUTPATIENT
Start: 2023-05-01 | End: 2023-05-03

## 2023-05-01 RX ORDER — PRAMOXINE HYDROCHLORIDE 150 MG/15G
1 AEROSOL, FOAM RECTAL EVERY 4 HOURS
Refills: 0 | Status: DISCONTINUED | OUTPATIENT
Start: 2023-05-01 | End: 2023-05-03

## 2023-05-01 RX ORDER — HYDROCORTISONE 1 %
1 OINTMENT (GRAM) TOPICAL EVERY 6 HOURS
Refills: 0 | Status: DISCONTINUED | OUTPATIENT
Start: 2023-05-01 | End: 2023-05-03

## 2023-05-01 RX ORDER — CITRIC ACID/SODIUM CITRATE 300-500 MG
15 SOLUTION, ORAL ORAL EVERY 6 HOURS
Refills: 0 | Status: DISCONTINUED | OUTPATIENT
Start: 2023-05-01 | End: 2023-05-01

## 2023-05-01 RX ORDER — ACETAMINOPHEN 500 MG
975 TABLET ORAL
Refills: 0 | Status: DISCONTINUED | OUTPATIENT
Start: 2023-05-01 | End: 2023-05-03

## 2023-05-01 RX ORDER — DIPHENHYDRAMINE HCL 50 MG
25 CAPSULE ORAL EVERY 6 HOURS
Refills: 0 | Status: DISCONTINUED | OUTPATIENT
Start: 2023-05-01 | End: 2023-05-03

## 2023-05-01 RX ORDER — OXYTOCIN 10 UNIT/ML
4 VIAL (ML) INJECTION
Qty: 30 | Refills: 0 | Status: DISCONTINUED | OUTPATIENT
Start: 2023-05-01 | End: 2023-05-01

## 2023-05-01 RX ORDER — MAGNESIUM HYDROXIDE 400 MG/1
30 TABLET, CHEWABLE ORAL
Refills: 0 | Status: DISCONTINUED | OUTPATIENT
Start: 2023-05-01 | End: 2023-05-03

## 2023-05-01 RX ORDER — BENZOCAINE 10 %
1 GEL (GRAM) MUCOUS MEMBRANE EVERY 6 HOURS
Refills: 0 | Status: DISCONTINUED | OUTPATIENT
Start: 2023-05-01 | End: 2023-05-03

## 2023-05-01 RX ORDER — OXYTOCIN 10 UNIT/ML
41.67 VIAL (ML) INJECTION
Qty: 20 | Refills: 0 | Status: DISCONTINUED | OUTPATIENT
Start: 2023-05-01 | End: 2023-05-03

## 2023-05-01 RX ORDER — OXYTOCIN 10 UNIT/ML
333.33 VIAL (ML) INJECTION
Qty: 20 | Refills: 0 | Status: DISCONTINUED | OUTPATIENT
Start: 2023-05-01 | End: 2023-05-03

## 2023-05-01 RX ORDER — CHLORHEXIDINE GLUCONATE 213 G/1000ML
1 SOLUTION TOPICAL ONCE
Refills: 0 | Status: DISCONTINUED | OUTPATIENT
Start: 2023-05-01 | End: 2023-05-01

## 2023-05-01 RX ORDER — KETOROLAC TROMETHAMINE 30 MG/ML
30 SYRINGE (ML) INJECTION ONCE
Refills: 0 | Status: DISCONTINUED | OUTPATIENT
Start: 2023-05-01 | End: 2023-05-01

## 2023-05-01 RX ADMIN — SODIUM CHLORIDE 3 MILLILITER(S): 9 INJECTION INTRAMUSCULAR; INTRAVENOUS; SUBCUTANEOUS at 19:20

## 2023-05-01 RX ADMIN — SODIUM CHLORIDE 125 MILLILITER(S): 9 INJECTION, SOLUTION INTRAVENOUS at 15:15

## 2023-05-01 RX ADMIN — Medication 4 MILLIUNIT(S)/MIN: at 18:00

## 2023-05-01 RX ADMIN — Medication 1000 MILLIUNIT(S)/MIN: at 18:37

## 2023-05-01 RX ADMIN — Medication 10 UNIT(S): at 18:37

## 2023-05-01 RX ADMIN — SODIUM CHLORIDE 1000 MILLILITER(S): 9 INJECTION, SOLUTION INTRAVENOUS at 21:38

## 2023-05-01 RX ADMIN — Medication 30 MILLIGRAM(S): at 19:21

## 2023-05-01 RX ADMIN — SODIUM CHLORIDE 125 MILLILITER(S): 9 INJECTION, SOLUTION INTRAVENOUS at 17:46

## 2023-05-01 RX ADMIN — SODIUM CHLORIDE 125 MILLILITER(S): 9 INJECTION, SOLUTION INTRAVENOUS at 16:31

## 2023-05-01 NOTE — OB RN DELIVERY SUMMARY - NSSELHIDDEN_OBGYN_ALL_OB_FT
[NS_DeliveryAttending1_OBGYN_ALL_OB_FT:[email or web address]=],[NS_DeliveryRN_OBGYN_ALL_OB_FT:[id number]==]

## 2023-05-01 NOTE — OB POSTPARTUM EVENT NOTE - NS_EVENTSUMMARY2_OBGYN_ALL_OB_FT
notified by RN pt ambulated to bathroom w/assistance; no complaints, no lightheadedness/dizziness, CP, SOB.   no urge to void.

## 2023-05-01 NOTE — PRE-ANESTHESIA EVALUATION ADULT - SPO2 (%)
Detail Level: Detailed Body Location Override (Optional - Billing Will Still Be Based On Selected Body Map Location If Applicable): left upper eyelid Name Of The Referring Provider For Procedure: ALEXIA Wallace MD Size Of Lesion: 0.4 Incorporate Mauc In Note: Yes 98

## 2023-05-01 NOTE — OB PROVIDER DELIVERY SUMMARY - NSPROVIDERDELIVERYNOTE_OBGYN_ALL_OB_FT
, pt delivered of a viable female infant apgar 8/9 weight 8-0, second degree laceration. ebl 350 cc.

## 2023-05-01 NOTE — CHART NOTE - NSCHARTNOTEFT_GEN_A_CORE
S: Called to PACU due to patient losing consciousness in bathroom. Patient was sitting on the toilet when she felt dizziness. Nurses were in the restroom and witnessed her lose consciousness. She fell onto the nurses and did not hit her head. She regained consciousness after a few seconds. On evaluation dizziness in improved. She complains of ringing in her ears. She denies SOB, chest pain, palpitations.    Vital Signs Last 24 Hrs  T(C): 36.6 (01 May 2023 19:02), Max: 37.0 (01 May 2023 14:57)  T(F): 97.88 (01 May 2023 19:02), Max: 98.6 (01 May 2023 14:57)  HR: 80 (01 May 2023 20:00) (63 - 118)  BP: 129/67 (01 May 2023 19:45) (68/30 - 143/60)  BP(mean): 89 (01 May 2023 19:45) (89 - 89)  RR: 16 (01 May 2023 19:45) (16 - 18)  SpO2: 100% (01 May 2023 20:00) (89% - 100%)    Parameters below as of 01 May 2023 19:45  Patient On (Oxygen Delivery Method): room air    PE  Gen: laying in bed, NAD  Abd: soft, appropriately tender, fundus firm below umbilicus  Vag: minimal bleeding on fundal check    A/P: 22yo s/p   presenting with loss of consciousness. Vital signs are within normal limits and patient is improving  - 1L LR bolus  - Re-examine after bolus    Natasha Alston, PGY1  Patient seen with Dr. Guidry and Maria Luisa Palomo PGY4

## 2023-05-01 NOTE — OB POSTPARTUM EVENT NOTE - NS_EVENTSUMMARY1_OBGYN_ALL_OB_FT
s/p  at 1833, EBL 350ml. patient OOB to bathroom for 1st void assisted by RN. patient sitting on toilet stated "I feel very dizzy. I feel like I'm blacking out". Juice given, Ice packs placed on patient. Loss of consciousness x1 while still sitting on toilet leaning against RN and code bell pulled. Sternal rub done by RN, consciousness regained. MD Guidry, MD Palomo, MD Alston, ANTONIO Owen, RN Gerbavsits at patient's side assisting. patient assisted to wheelchair and then assisted to bed. Vitals taken, IV bolus started. Patient assessed by MD Alston.

## 2023-05-01 NOTE — OB POSTPARTUM EVENT NOTE - NS_EVENTPTSUMMARY1_OBGYN_ALL_OB_FT
/68, HR 78, RR 18. O2 sat 100%. fundus firm, mild lochia noted. patient did not void. IVF bolus of LR given.

## 2023-05-01 NOTE — OB PROVIDER H&P - ASSESSMENT
22yo  @ 40w4d admitted in labor.  GBS neg.    Plan:  -admit to L&D  -routine labs  -IVH  -continue to reassess    Plan per Dr. Daniel Herrera, PGY4

## 2023-05-01 NOTE — OB PROVIDER DELIVERY SUMMARY - NSSELHIDDEN_OBGYN_ALL_OB_FT
[NS_DeliveryAttending1_OBGYN_ALL_OB_FT:[email or web address]=],[NS_DeliveryRN_OBGYN_ALL_OB_FT:[id number]==]

## 2023-05-01 NOTE — OB PROVIDER H&P - NSLOWPPHRISK_OBGYN_A_OB
No previous uterine incision/[place] Pregnancy/Less than or equal to 4 previous vaginal births/No known bleeding disorder/No history of postpartum hemorrhage

## 2023-05-01 NOTE — OB RN PATIENT PROFILE - FALL HARM RISK - HARM RISK INTERVENTIONS

## 2023-05-01 NOTE — OB RN DELIVERY SUMMARY - NS_SEPSISRSKCALC_OBGYN_ALL_OB_FT
EOS calculated successfully. EOS Risk Factor: 0.5/1000 live births ([facility] national incidence); GA=40w4d; Temp=98.6; ROM=1.067; GBS='Negative'; Antibiotics='No antibiotics or any antibiotics < 2 hrs prior to birth'

## 2023-05-01 NOTE — OB PROVIDER H&P - HISTORY OF PRESENT ILLNESS
24yo  @ 40w4d (CORNELIUS ) presents with contractions and noted to be 5cm in office. +FM, -LOF, -VB    PNC: uncomplicated  GBS: negative  EFW: 3500    OBHx:  FT  7#11  GYNHx: No ovarian cysts, fibroids, hx of abnormal pap or STDs  PMHx: No hx of DM, HTN, asthma, bleeding or clotting disorders or blood transfusions.  PSurgHx: ACL repair   Allergies: NKDA  Meds: PNV  Social: No smoking, alcohol, or illicit drug use during pregnancy   Psych: No hx of anxiety or depression 24yo  @ 40w4d (CORNELIUS ) presents with contractions starting at 7a this AM and noted to be 5cm in office. +FM, -LOF, -VB    PNC: uncomplicated  GBS: negative  EFW: 3500    OBHx:  FT  7#11  GYNHx: No ovarian cysts, fibroids, hx of abnormal pap or STDs  PMHx: No hx of DM, HTN, asthma, bleeding or clotting disorders or blood transfusions.  PSurgHx: ACL repair   Allergies: NKDA  Meds: PNV  Social: No smoking, alcohol, or illicit drug use during pregnancy   Psych: No hx of anxiety or depression    SVE: /-3 per Dr. Daniel Joiner: Vertex

## 2023-05-02 DIAGNOSIS — O47.1 FALSE LABOR AT OR AFTER 37 COMPLETED WEEKS OF GESTATION: ICD-10-CM

## 2023-05-02 DIAGNOSIS — O48.0 POST-TERM PREGNANCY: ICD-10-CM

## 2023-05-02 DIAGNOSIS — Z3A.40 40 WEEKS GESTATION OF PREGNANCY: ICD-10-CM

## 2023-05-02 LAB — T PALLIDUM AB TITR SER: NEGATIVE — SIGNIFICANT CHANGE UP

## 2023-05-02 RX ORDER — IBUPROFEN 200 MG
600 TABLET ORAL EVERY 6 HOURS
Refills: 0 | Status: DISCONTINUED | OUTPATIENT
Start: 2023-05-02 | End: 2023-05-03

## 2023-05-02 RX ADMIN — Medication 600 MILLIGRAM(S): at 00:54

## 2023-05-02 RX ADMIN — Medication 1 TABLET(S): at 12:17

## 2023-05-02 RX ADMIN — Medication 600 MILLIGRAM(S): at 12:47

## 2023-05-02 RX ADMIN — Medication 975 MILLIGRAM(S): at 09:18

## 2023-05-02 RX ADMIN — Medication 975 MILLIGRAM(S): at 22:05

## 2023-05-02 RX ADMIN — Medication 975 MILLIGRAM(S): at 15:59

## 2023-05-02 RX ADMIN — Medication 600 MILLIGRAM(S): at 06:43

## 2023-05-02 RX ADMIN — Medication 600 MILLIGRAM(S): at 12:17

## 2023-05-02 RX ADMIN — Medication 975 MILLIGRAM(S): at 21:19

## 2023-05-02 RX ADMIN — Medication 975 MILLIGRAM(S): at 16:29

## 2023-05-02 RX ADMIN — Medication 600 MILLIGRAM(S): at 06:10

## 2023-05-02 RX ADMIN — Medication 975 MILLIGRAM(S): at 04:04

## 2023-05-02 RX ADMIN — Medication 600 MILLIGRAM(S): at 18:07

## 2023-05-02 RX ADMIN — Medication 600 MILLIGRAM(S): at 01:50

## 2023-05-02 RX ADMIN — Medication 600 MILLIGRAM(S): at 23:55

## 2023-05-02 RX ADMIN — Medication 975 MILLIGRAM(S): at 09:48

## 2023-05-02 RX ADMIN — Medication 975 MILLIGRAM(S): at 03:09

## 2023-05-02 NOTE — PROGRESS NOTE ADULT - SUBJECTIVE AND OBJECTIVE BOX
OB Progress Note:  PPD#1    S: 24yo PPD#1 s/p . Pain controlled. Patient tolerating regular diet, voiding spontaneously, and ambulating. Denies significant VB, chest pain, shortness of breath, n/v, light-headedness/dizziness.       O:  Vitals:  Vital Signs Last 24 Hrs  T(C): 36.5 (02 May 2023 05:50), Max: 37.0 (01 May 2023 14:57)  T(F): 97.7 (02 May 2023 05:50), Max: 98.6 (01 May 2023 14:57)  HR: 78 (02 May 2023 05:50) (63 - 118)  BP: 105/67 (02 May 2023 05:50) (68/30 - 143/60)  BP(mean): 90 (01 May 2023 21:43) (89 - 91)  RR: 18 (02 May 2023 05:50) (16 - 18)  SpO2: 97% (02 May 2023 05:50) (89% - 100%)    Parameters below as of 02 May 2023 05:50  Patient On (Oxygen Delivery Method): room air        MEDICATIONS  (STANDING):  acetaminophen     Tablet .. 975 milliGRAM(s) Oral <User Schedule>  diphtheria/tetanus/pertussis (acellular) Vaccine (Adacel) 0.5 milliLiter(s) IntraMuscular once  ibuprofen  Tablet. 600 milliGRAM(s) Oral every 6 hours  oxytocin Infusion 333.333 milliUNIT(s)/Min (1000 mL/Hr) IV Continuous <Continuous>  oxytocin Infusion 41.667 milliUNIT(s)/Min (125 mL/Hr) IV Continuous <Continuous>  prenatal multivitamin 1 Tablet(s) Oral daily  sodium chloride 0.9% lock flush 3 milliLiter(s) IV Push every 8 hours      Labs:  Blood type: B Positive  Rubella IgG: RPR: Negative                          12.0   18.17<H> >-----------< 305    (  @ 15:55 )             38.1                  Physical Exam:  General: No acute distress. Resting comfortably prior to evaluation  Respiratory: No respiratory distress. Unlabored breathing   Abdomen: Soft. Non-tender. Non-distended. Fundus is firm  Extremities: No calf tenderness bilaterally

## 2023-05-03 ENCOUNTER — TRANSCRIPTION ENCOUNTER (OUTPATIENT)
Age: 23
End: 2023-05-03

## 2023-05-03 VITALS
HEART RATE: 77 BPM | DIASTOLIC BLOOD PRESSURE: 75 MMHG | RESPIRATION RATE: 18 BRPM | SYSTOLIC BLOOD PRESSURE: 115 MMHG | OXYGEN SATURATION: 96 % | TEMPERATURE: 98 F

## 2023-05-03 PROCEDURE — 86900 BLOOD TYPING SEROLOGIC ABO: CPT

## 2023-05-03 PROCEDURE — 86901 BLOOD TYPING SEROLOGIC RH(D): CPT

## 2023-05-03 PROCEDURE — 59050 FETAL MONITOR W/REPORT: CPT

## 2023-05-03 PROCEDURE — 86769 SARS-COV-2 COVID-19 ANTIBODY: CPT

## 2023-05-03 PROCEDURE — 86780 TREPONEMA PALLIDUM: CPT

## 2023-05-03 PROCEDURE — 85025 COMPLETE CBC W/AUTO DIFF WBC: CPT

## 2023-05-03 PROCEDURE — 86850 RBC ANTIBODY SCREEN: CPT

## 2023-05-03 RX ADMIN — Medication 975 MILLIGRAM(S): at 03:40

## 2023-05-03 RX ADMIN — Medication 600 MILLIGRAM(S): at 06:14

## 2023-05-03 RX ADMIN — Medication 975 MILLIGRAM(S): at 02:48

## 2023-05-03 RX ADMIN — Medication 600 MILLIGRAM(S): at 05:32

## 2023-05-03 RX ADMIN — Medication 975 MILLIGRAM(S): at 09:13

## 2023-05-03 RX ADMIN — Medication 600 MILLIGRAM(S): at 00:40

## 2023-05-03 RX ADMIN — Medication 975 MILLIGRAM(S): at 10:28

## 2023-05-03 NOTE — DISCHARGE NOTE OB - BREASTFEEDING PROVIDES MATERNAL HEALTH BENEFITS, DECREASED PREMENOPAUSAL BREAST CANCER, OVARIAN CANCER AND TYPE II DIABETES MELLITUS
Statement Selected

## 2023-05-03 NOTE — DISCHARGE NOTE OB - CARE PLAN
1 Principal Discharge DX:	 (normal spontaneous vaginal delivery)  Assessment and plan of treatment:	Return to baseline health, regular diet, pain control. Follow up with Dr. Sanchez.

## 2023-05-03 NOTE — DISCHARGE NOTE OB - MEDICATION SUMMARY - MEDICATIONS TO TAKE
I will START or STAY ON the medications listed below when I get home from the hospital:    ibuprofen 600 mg oral tablet  -- 1 tab(s) by mouth every 6 hours  -- Indication: For cramping    acetaminophen 325 mg oral tablet  -- 3 tab(s) by mouth every 6 hours as needed for  mild pain  -- Indication: For mild pain

## 2023-05-03 NOTE — DISCHARGE NOTE OB - CARE PROVIDER_API CALL
Portia Sanchez  OBSTETRICS AND GYNECOLOGY  [address], [address]  [place] [address]  Phone: [phone]  Fax: [phone]  Follow Up Time:

## 2023-05-03 NOTE — DISCHARGE NOTE OB - NS MD DC FALL RISK RISK
For information on Fall & Injury Prevention, visit: [email or web address] OR  [email or web address] OR  https://www.cdc.gov/steadi/patient.html

## 2023-05-03 NOTE — DISCHARGE NOTE OB - INSTRUCTIONS
Follow up with MD as directed. Call if any increased pain, fever, chills, pain on urination, heavy vaginal bleeding.

## 2023-05-03 NOTE — DISCHARGE NOTE OB - NSDCQMERRANDS_GEN_ALL_CORE
Described as a twinge every 2 wks since 1/2022  Not related to activity, meal, bowel habits  Lasts 1-2 min  Doesn't radiate anywhere  Is due for colonoscopy  No associated N/V/ wt loss, urinary symptoms, bowels are nl, no bleeding.   S/p barb in the past.     Exam shows a subumbilical mass, not in direct connection w/ her umbilius - pt points to this as her hernia  With mass and RUQ pain, will obtain CT abd for further eval      No

## 2023-05-03 NOTE — DISCHARGE NOTE OB - AVOID PROLONGED STANDING
Statement Selected Modify Regimen: Increase to Isotretinoin 40mg 1 by mouth twice a day Detail Level: Zone

## 2023-05-03 NOTE — DISCHARGE NOTE OB - PATIENT PORTAL LINK FT
You can access the FollowMyHealth Patient Portal offered by [facility] by registering at the following website: [email or web address]. By joining [facility]’s FollowMyHealth portal, you will also be able to view your health information using other applications (apps) compatible with our system.

## 2023-08-28 ENCOUNTER — APPOINTMENT (OUTPATIENT)
Dept: ULTRASOUND IMAGING | Facility: CLINIC | Age: 23
End: 2023-08-28

## 2023-11-17 NOTE — OB PROVIDER IHI INDUCTION/AUGMENTATION NOTE - NS_FETALPRESENTATIONA_OBGYN_ALL_OB
Routine nursing visit, patient report continued improvement with ADLs, bu had a fall recently.  Patient seen at [place] ED and discharged.  Improvement in ambulation and endurance.  Bilat lower extremity edema continues.  Patient seeing endocrinology for adjustments in diabetes management.  Given recent fall and planned changes in diabetes management, plan to add 3 weeks of further nursing visits.
Cephalic

## 2024-01-08 NOTE — OB RN DELIVERY SUMMARY - NS_LABORDURATION_OBGYN_ALL_OB_FT
Left message to call back....................  1/8/2024   11:35 AM  Gaby Garcia LPN ....................1/8/2024  11:35 AM  Ext [phone]   11 Hour(s) 3 Minute(s)

## 2025-01-13 ENCOUNTER — APPOINTMENT (OUTPATIENT)
Dept: ANTEPARTUM | Facility: CLINIC | Age: 25
End: 2025-01-13
Payer: COMMERCIAL

## 2025-01-13 ENCOUNTER — ASOB RESULT (OUTPATIENT)
Age: 25
End: 2025-01-13

## 2025-01-13 PROCEDURE — 76805 OB US >/= 14 WKS SNGL FETUS: CPT

## 2025-04-07 NOTE — DISCHARGE NOTE OB - PLAN OF CARE
Faxed over medical records to [facility] office records     Fax # [phone]   Return to baseline health, regular diet, pain control. Follow up with Dr. Sanchez.